# Patient Record
Sex: FEMALE | Race: OTHER | URBAN - METROPOLITAN AREA
[De-identification: names, ages, dates, MRNs, and addresses within clinical notes are randomized per-mention and may not be internally consistent; named-entity substitution may affect disease eponyms.]

---

## 2020-07-22 ENCOUNTER — OFFICE VISIT (OUTPATIENT)
Dept: URBAN - METROPOLITAN AREA SURGERY CENTER 30 | Facility: SURGERY CENTER | Age: 60
End: 2020-07-22
Payer: COMMERCIAL

## 2020-07-22 DIAGNOSIS — Z12.11 COLON CANCER SCREENING: ICD-10-CM

## 2020-07-22 DIAGNOSIS — Z83.71 FAMILY HISTORY OF COLON POLYPS: ICD-10-CM

## 2020-07-22 PROCEDURE — G0105 COLORECTAL SCRN; HI RISK IND: HCPCS | Performed by: INTERNAL MEDICINE

## 2020-07-22 PROCEDURE — G8907 PT DOC NO EVENTS ON DISCHARG: HCPCS | Performed by: INTERNAL MEDICINE

## 2020-07-22 PROCEDURE — G9935 CANC NOT DETECTD DURING SRCN: HCPCS | Performed by: INTERNAL MEDICINE

## 2020-08-06 ENCOUNTER — OFFICE VISIT (OUTPATIENT)
Dept: URBAN - METROPOLITAN AREA CLINIC 17 | Facility: CLINIC | Age: 60
End: 2020-08-06
Payer: COMMERCIAL

## 2020-08-06 DIAGNOSIS — R74.8 ELEVATED LIVER ENZYMES: ICD-10-CM

## 2020-08-06 DIAGNOSIS — E61.1 IRON DEFICIENCY: ICD-10-CM

## 2020-08-06 DIAGNOSIS — D50.9 IRON DEFICIENCY ANEMIA, UNSPECIFIED IRON DEFICIENCY ANEMIA TYPE: ICD-10-CM

## 2020-08-06 PROCEDURE — 99214 OFFICE O/P EST MOD 30 MIN: CPT | Performed by: INTERNAL MEDICINE

## 2020-08-06 RX ORDER — HYDROCHLOROTHIAZIDE 12.5 MG/1
1 CAPSULE IN THE MORNING CAPSULE ORAL ONCE A DAY
Status: ACTIVE | COMMUNITY

## 2020-08-06 RX ORDER — LOSARTAN POTASSIUM 50 MG/1
1 TABLET TABLET ORAL ONCE A DAY
Status: ACTIVE | COMMUNITY

## 2020-08-06 NOTE — HPI-TODAY'S VISIT:
This is a 60-year-old female referred for elevated liver enzymes.  The patient was found to have an increase in her AST from 40-52 July 2020.  She denies abdominal pain, weight loss, nausea, vomiting, dark tarry stools, and bright red blood per rectum.  She denies exposure to hepatitis A, B, or C.  There is no personal or family history of chronic liver disease. The patient also has a long history of iron deficiency anemia secondary to gastric bypass surgery performed approximately 2005.  She requires IV iron infusions at least twice a year.  A recent colonoscopy which I performed July 2020 for a  family history of colon cancer was unremarkable.

## 2020-08-10 LAB
A/G RATIO: 1.8
ACTIN (SMOOTH MUSCLE) ANTIBODY: 4
ALBUMIN: 4.6
ALKALINE PHOSPHATASE: 101
ALT (SGPT): 35
ANTINUCLEAR ANTIBODIES, IFA: NEGATIVE
AST (SGOT): 41
BILIRUBIN, TOTAL: 0.3
BUN/CREATININE RATIO: 30
BUN: 21
CALCIUM: 10.2
CARBON DIOXIDE, TOTAL: 25
CHLORIDE: 98
CREATININE: 0.69
EGFR IF AFRICN AM: 109
EGFR IF NONAFRICN AM: 95
GLOBULIN, TOTAL: 2.6
GLUCOSE: 85
HBSAG SCREEN: NEGATIVE
HCV AB: <0.1
INTERPRETATION:: (no result)
Lab: (no result)
MITOCHONDRIAL (M2) ANTIBODY: <20
POTASSIUM: 4.8
PROTEIN, TOTAL: 7.2
SODIUM: 141

## 2020-08-28 ENCOUNTER — TELEPHONE ENCOUNTER (OUTPATIENT)
Dept: URBAN - METROPOLITAN AREA CLINIC 92 | Facility: CLINIC | Age: 60
End: 2020-08-28

## 2020-09-03 ENCOUNTER — OFFICE VISIT (OUTPATIENT)
Dept: URBAN - METROPOLITAN AREA CLINIC 17 | Facility: CLINIC | Age: 60
End: 2020-09-03

## 2020-09-22 ENCOUNTER — TELEPHONE ENCOUNTER (OUTPATIENT)
Dept: URBAN - METROPOLITAN AREA CLINIC 92 | Facility: CLINIC | Age: 60
End: 2020-09-22

## 2020-10-01 ENCOUNTER — OFFICE VISIT (OUTPATIENT)
Dept: URBAN - METROPOLITAN AREA CLINIC 17 | Facility: CLINIC | Age: 60
End: 2020-10-01
Payer: COMMERCIAL

## 2020-10-01 ENCOUNTER — TELEPHONE ENCOUNTER (OUTPATIENT)
Dept: URBAN - METROPOLITAN AREA CLINIC 92 | Facility: CLINIC | Age: 60
End: 2020-10-01

## 2020-10-01 ENCOUNTER — DASHBOARD ENCOUNTERS (OUTPATIENT)
Age: 60
End: 2020-10-01

## 2020-10-01 DIAGNOSIS — R10.2 PELVIC PAIN: ICD-10-CM

## 2020-10-01 DIAGNOSIS — R93.5 ABNORMAL MRI OF ABDOMEN: ICD-10-CM

## 2020-10-01 DIAGNOSIS — K86.2 PANCREATIC CYST: ICD-10-CM

## 2020-10-01 DIAGNOSIS — K80.20 GALLSTONE: ICD-10-CM

## 2020-10-01 PROBLEM — 256896000 GALLSTONE: Status: ACTIVE | Noted: 2020-10-01

## 2020-10-01 PROCEDURE — 99214 OFFICE O/P EST MOD 30 MIN: CPT | Performed by: INTERNAL MEDICINE

## 2020-10-01 RX ORDER — HYDROCHLOROTHIAZIDE 12.5 MG/1
1 CAPSULE IN THE MORNING CAPSULE ORAL ONCE A DAY
Status: ACTIVE | COMMUNITY

## 2020-10-01 RX ORDER — LOSARTAN POTASSIUM 50 MG/1
1 TABLET TABLET ORAL ONCE A DAY
Status: ACTIVE | COMMUNITY

## 2020-10-01 NOTE — HPI-TODAY'S VISIT:
Ms. Rabago is a 6-year-old female here for follow up of elevated liver enzymes.  A repeat CMP revealed mildly elevated AST and ALT of 41 and 35 respectively.  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  1 year follow-up was recommended. The patient also complains of the sensation of incomplete emptying despite having formed bowel movements 3 times a day.  A colonoscopy July 2020 was essentially unremarkable. The patient reports lower abdominal pelvic discomfort rated at 2-3 out of 10.  Not associated with bowel movements or meals.  Her ovaries and uterus remain intact.

## 2020-11-02 ENCOUNTER — TELEPHONE ENCOUNTER (OUTPATIENT)
Dept: URBAN - METROPOLITAN AREA CLINIC 92 | Facility: CLINIC | Age: 60
End: 2020-11-02

## 2025-04-04 ENCOUNTER — LAB OUTSIDE AN ENCOUNTER (OUTPATIENT)
Dept: URBAN - METROPOLITAN AREA SURGERY CENTER 30 | Facility: SURGERY CENTER | Age: 65
End: 2025-04-04

## 2025-04-09 ENCOUNTER — CLAIMS CREATED FROM THE CLAIM WINDOW (OUTPATIENT)
Dept: URBAN - METROPOLITAN AREA CLINIC 4 | Facility: CLINIC | Age: 65
End: 2025-04-09
Payer: COMMERCIAL

## 2025-04-09 ENCOUNTER — OFFICE VISIT (OUTPATIENT)
Dept: URBAN - METROPOLITAN AREA SURGERY CENTER 30 | Facility: SURGERY CENTER | Age: 65
End: 2025-04-09
Payer: COMMERCIAL

## 2025-04-09 DIAGNOSIS — Z80.0 FAMILY HISTORY OF MALIGNANT NEOPLASM OF DIGESTIVE ORGANS: ICD-10-CM

## 2025-04-09 DIAGNOSIS — Z12.11 COLON CANCER SCREENING: ICD-10-CM

## 2025-04-09 DIAGNOSIS — Z12.11 ENCOUNTER FOR SCREENING FOR MALIGNANT NEOPLASM OF COLON: ICD-10-CM

## 2025-04-09 DIAGNOSIS — K63.5 BENIGN COLON POLYP: ICD-10-CM

## 2025-04-09 DIAGNOSIS — K63.5 POLYP OF COLON: ICD-10-CM

## 2025-04-09 DIAGNOSIS — Z80.0 FAMILY HISTORY OF COLON CANCER: ICD-10-CM

## 2025-04-09 DIAGNOSIS — K63.5 HYPERPLASTIC POLYP OF SIGMOID COLON: ICD-10-CM

## 2025-04-09 PROCEDURE — 45380 COLONOSCOPY AND BIOPSY: CPT | Performed by: INTERNAL MEDICINE

## 2025-04-09 PROCEDURE — 88305 TISSUE EXAM BY PATHOLOGIST: CPT | Performed by: PATHOLOGY

## 2025-04-09 PROCEDURE — 00812 ANES LWR INTST SCR COLSC: CPT | Performed by: NURSE ANESTHETIST, CERTIFIED REGISTERED

## 2025-04-09 RX ORDER — LOSARTAN POTASSIUM 50 MG/1
1 TABLET TABLET ORAL ONCE A DAY
Status: ACTIVE | COMMUNITY

## 2025-04-09 RX ORDER — HYDROCHLOROTHIAZIDE 12.5 MG/1
1 CAPSULE IN THE MORNING CAPSULE ORAL ONCE A DAY
Status: ACTIVE | COMMUNITY